# Patient Record
Sex: MALE | Race: WHITE | Employment: FULL TIME | URBAN - METROPOLITAN AREA
[De-identification: names, ages, dates, MRNs, and addresses within clinical notes are randomized per-mention and may not be internally consistent; named-entity substitution may affect disease eponyms.]

---

## 2024-01-24 ENCOUNTER — OFFICE VISIT (OUTPATIENT)
Dept: INTERNAL MEDICINE CLINIC | Facility: CLINIC | Age: 54
End: 2024-01-24
Payer: COMMERCIAL

## 2024-01-24 ENCOUNTER — TELEPHONE (OUTPATIENT)
Dept: INTERNAL MEDICINE CLINIC | Facility: CLINIC | Age: 54
End: 2024-01-24

## 2024-01-24 ENCOUNTER — LAB ENCOUNTER (OUTPATIENT)
Dept: LAB | Facility: REFERENCE LAB | Age: 54
End: 2024-01-24
Attending: INTERNAL MEDICINE
Payer: COMMERCIAL

## 2024-01-24 VITALS
HEART RATE: 96 BPM | BODY MASS INDEX: 23.6 KG/M2 | WEIGHT: 163 LBS | OXYGEN SATURATION: 97 % | SYSTOLIC BLOOD PRESSURE: 108 MMHG | TEMPERATURE: 98 F | DIASTOLIC BLOOD PRESSURE: 66 MMHG | HEIGHT: 69.5 IN

## 2024-01-24 DIAGNOSIS — Z76.89 ENCOUNTER TO ESTABLISH CARE WITH NEW DOCTOR: Primary | ICD-10-CM

## 2024-01-24 DIAGNOSIS — K40.90 UNILATERAL INGUINAL HERNIA WITHOUT OBSTRUCTION OR GANGRENE, RECURRENCE NOT SPECIFIED: ICD-10-CM

## 2024-01-24 DIAGNOSIS — Z12.5 ENCOUNTER FOR SCREENING FOR MALIGNANT NEOPLASM OF PROSTATE: ICD-10-CM

## 2024-01-24 DIAGNOSIS — Z00.00 ANNUAL PHYSICAL EXAM: ICD-10-CM

## 2024-01-24 DIAGNOSIS — D22.9 CHANGE IN MOLE: ICD-10-CM

## 2024-01-24 LAB
ALBUMIN SERPL-MCNC: 4.8 G/DL (ref 3.2–4.8)
ALBUMIN/GLOB SERPL: 1.5 {RATIO} (ref 1–2)
ALP LIVER SERPL-CCNC: 75 U/L
ALT SERPL-CCNC: 29 U/L
ANION GAP SERPL CALC-SCNC: 8 MMOL/L (ref 0–18)
AST SERPL-CCNC: 20 U/L (ref ?–34)
BASOPHILS # BLD AUTO: 0.04 X10(3) UL (ref 0–0.2)
BASOPHILS NFR BLD AUTO: 0.5 %
BILIRUB SERPL-MCNC: 0.8 MG/DL (ref 0.3–1.2)
BUN BLD-MCNC: 9 MG/DL (ref 9–23)
BUN/CREAT SERPL: 8.5 (ref 10–20)
CALCIUM BLD-MCNC: 10.5 MG/DL (ref 8.7–10.4)
CHLORIDE SERPL-SCNC: 102 MMOL/L (ref 98–112)
CHOLEST SERPL-MCNC: 192 MG/DL (ref ?–200)
CO2 SERPL-SCNC: 30 MMOL/L (ref 21–32)
COMPLEXED PSA SERPL-MCNC: 1.54 NG/ML (ref ?–4)
CREAT BLD-MCNC: 1.06 MG/DL
DEPRECATED RDW RBC AUTO: 39 FL (ref 35.1–46.3)
EGFRCR SERPLBLD CKD-EPI 2021: 84 ML/MIN/1.73M2 (ref 60–?)
EOSINOPHIL # BLD AUTO: 0.42 X10(3) UL (ref 0–0.7)
EOSINOPHIL NFR BLD AUTO: 5.5 %
ERYTHROCYTE [DISTWIDTH] IN BLOOD BY AUTOMATED COUNT: 12.9 % (ref 11–15)
EST. AVERAGE GLUCOSE BLD GHB EST-MCNC: 114 MG/DL (ref 68–126)
FASTING PATIENT LIPID ANSWER: NO
FASTING STATUS PATIENT QL REPORTED: NO
GLOBULIN PLAS-MCNC: 3.2 G/DL (ref 2.8–4.4)
GLUCOSE BLD-MCNC: 110 MG/DL (ref 70–99)
HBA1C MFR BLD: 5.6 % (ref ?–5.7)
HCT VFR BLD AUTO: 48 %
HDLC SERPL-MCNC: 59 MG/DL (ref 40–59)
HGB BLD-MCNC: 16.1 G/DL
IMM GRANULOCYTES # BLD AUTO: 0.01 X10(3) UL (ref 0–1)
IMM GRANULOCYTES NFR BLD: 0.1 %
LDLC SERPL CALC-MCNC: 120 MG/DL (ref ?–100)
LYMPHOCYTES # BLD AUTO: 2.33 X10(3) UL (ref 1–4)
LYMPHOCYTES NFR BLD AUTO: 30.4 %
MCH RBC QN AUTO: 27.9 PG (ref 26–34)
MCHC RBC AUTO-ENTMCNC: 33.5 G/DL (ref 31–37)
MCV RBC AUTO: 83 FL
MONOCYTES # BLD AUTO: 0.39 X10(3) UL (ref 0.1–1)
MONOCYTES NFR BLD AUTO: 5.1 %
NEUTROPHILS # BLD AUTO: 4.47 X10 (3) UL (ref 1.5–7.7)
NEUTROPHILS # BLD AUTO: 4.47 X10(3) UL (ref 1.5–7.7)
NEUTROPHILS NFR BLD AUTO: 58.4 %
NONHDLC SERPL-MCNC: 133 MG/DL (ref ?–130)
OSMOLALITY SERPL CALC.SUM OF ELEC: 289 MOSM/KG (ref 275–295)
PLATELET # BLD AUTO: 363 10(3)UL (ref 150–450)
POTASSIUM SERPL-SCNC: 4.6 MMOL/L (ref 3.5–5.1)
PROT SERPL-MCNC: 8 G/DL (ref 5.7–8.2)
RBC # BLD AUTO: 5.78 X10(6)UL
SODIUM SERPL-SCNC: 140 MMOL/L (ref 136–145)
TRIGL SERPL-MCNC: 69 MG/DL (ref 30–149)
VLDLC SERPL CALC-MCNC: 12 MG/DL (ref 0–30)
WBC # BLD AUTO: 7.7 X10(3) UL (ref 4–11)

## 2024-01-24 PROCEDURE — 36415 COLL VENOUS BLD VENIPUNCTURE: CPT | Performed by: INTERNAL MEDICINE

## 2024-01-24 PROCEDURE — 80053 COMPREHEN METABOLIC PANEL: CPT | Performed by: INTERNAL MEDICINE

## 2024-01-24 PROCEDURE — 3074F SYST BP LT 130 MM HG: CPT | Performed by: INTERNAL MEDICINE

## 2024-01-24 PROCEDURE — 3008F BODY MASS INDEX DOCD: CPT | Performed by: INTERNAL MEDICINE

## 2024-01-24 PROCEDURE — 85025 COMPLETE CBC W/AUTO DIFF WBC: CPT | Performed by: INTERNAL MEDICINE

## 2024-01-24 PROCEDURE — 80061 LIPID PANEL: CPT | Performed by: INTERNAL MEDICINE

## 2024-01-24 PROCEDURE — 99386 PREV VISIT NEW AGE 40-64: CPT | Performed by: INTERNAL MEDICINE

## 2024-01-24 PROCEDURE — 99203 OFFICE O/P NEW LOW 30 MIN: CPT | Performed by: INTERNAL MEDICINE

## 2024-01-24 PROCEDURE — 3078F DIAST BP <80 MM HG: CPT | Performed by: INTERNAL MEDICINE

## 2024-01-24 PROCEDURE — 83036 HEMOGLOBIN GLYCOSYLATED A1C: CPT | Performed by: INTERNAL MEDICINE

## 2024-01-31 NOTE — PROGRESS NOTES
[facility]  New Patient History and Physical      HPI:     Chief Complaint   Patient presents with    [facility]    Physical       Diane Hamilton is a 53 year old male presenting for:  Establishment [facility].  Has  has no past medical history on file.    Has a mole on anterior chest area that has grown over the past year.      Hx of unilateral inguinal hernia right side.  Feels some discomfort when lifting heavy items       Labs:   Complete Metabolic Panel:  Lab Results   Component Value Date/Time     01/24/2024 12:45 PM    K 4.6 01/24/2024 12:45 PM     01/24/2024 12:45 PM    CO2 30.0 01/24/2024 12:45 PM    CREATSERUM 1.06 01/24/2024 12:45 PM    CA 10.5 (H) 01/24/2024 12:45 PM     (H) 01/24/2024 12:45 PM    TP 8.0 01/24/2024 12:45 PM    ALB 4.8 01/24/2024 12:45 PM    ALKPHO 75 01/24/2024 12:45 PM    AST 20 01/24/2024 12:45 PM    ALT 29 01/24/2024 12:45 PM    BILT 0.8 01/24/2024 12:45 PM        CBC:  @LABRCNTIP(RBC:3,HGB:3,HCT:3,MCV:3,MCH:3,MCHC:3,RDW:3,NEPRELIM:3,WBC:3,PLT:3)@       Hemoglobin A1C, Microalbumin  Lab Results   Component Value Date/Time    A1C 5.6 01/24/2024 12:45 PM        Lipid panel  Lab Results   Component Value Date/Time    CHOLEST 192 01/24/2024 12:45 PM    HDL 59 01/24/2024 12:45 PM    TRIG 69 01/24/2024 12:45 PM     (H) 01/24/2024 12:45 PM    NONHDLC 133 (H) 01/24/2024 12:45 PM     The 10-year ASCVD risk score (Samm JIMENEZ, et al., 2019) is: 2.8%    Values used to calculate the score:      Age: 53 years      Sex: Male      Is Non- : No      Diabetic: No      Tobacco smoker: No      Systolic Blood Pressure: 108 mmHg      Is BP treated: No      HDL Cholesterol: 59 mg/dL      Total Cholesterol: 192 mg/dL       Medications:  No current outpatient medications on file.      PMH:  History reviewed. No pertinent past medical history.      PSH:  Past Surgical History:   Procedure Laterality Date    OTHER SURGICAL HISTORY       cyst removed form forehead and wrist       Allergies:  No Known Allergies   Social History:  Social History     Socioeconomic History    Marital status:    Tobacco Use    Smoking status: Never     Passive exposure: Never    Smokeless tobacco: Never   Substance and Sexual Activity    Alcohol use: Never    Drug use: Never        Family History:  Family History   Problem Relation Age of Onset    Other (Other) Father         liver problem    Other (Other) Mother         arthritis    Psychiatric Son         ptsd    Diabetes Brother         prediabets          REVIEW OF SYSTEMS:   Review of Systems   Constitutional:  Negative for chills, fatigue, fever and unexpected weight change.   HENT:  Negative for congestion, ear pain, hearing loss, rhinorrhea, sinus pain and sore throat.    Eyes:  Negative for pain, redness and visual disturbance.   Respiratory:  Negative for apnea, cough, chest tightness, shortness of breath and wheezing.    Cardiovascular:  Negative for chest pain, palpitations and leg swelling.   Gastrointestinal:  Positive for abdominal pain. Negative for abdominal distention, blood in stool, constipation and nausea.        See hernia symptoms HPI   Endocrine: Negative for cold intolerance, heat intolerance and polyuria.   Genitourinary:  Negative for dysuria, hematuria and urgency.   Musculoskeletal:  Negative for arthralgias, back pain, gait problem, joint swelling, myalgias and neck pain.   Skin:  Negative for rash and wound.   Allergic/Immunologic: Negative for food allergies and immunocompromised state.   Neurological:  Negative for dizziness, seizures, facial asymmetry, speech difficulty, weakness, light-headedness, numbness and headaches.   Hematological:  Negative for adenopathy. Does not bruise/bleed easily.   Psychiatric/Behavioral:  Negative for behavioral problems, sleep disturbance and suicidal ideas. The patient is not nervous/anxious.             PHYSICAL EXAM:   /66 (BP Location:  Right arm, Patient Position: Sitting, Cuff Size: adult)   Pulse 96   Temp 98.1 °F (36.7 °C) (Temporal)   Ht 5' 9.5\" (1.765 m)   Wt 163 lb (73.9 kg)   SpO2 97%   BMI 23.73 kg/m²  Estimated body mass index is 23.73 kg/m² as calculated from the following:    Height as of this encounter: 5' 9.5\" (1.765 m).    Weight as of this encounter: 163 lb (73.9 kg).     Wt Readings from Last 3 Encounters:   01/24/24 163 lb (73.9 kg)       Physical Exam  Vitals reviewed.   Constitutional:       General: He is not in acute distress.     Appearance: He is well-developed.   HENT:      Head: Normocephalic and atraumatic.   Eyes:      Conjunctiva/sclera: Conjunctivae normal.      Pupils: Pupils are equal, round, and reactive to light.   Neck:      Thyroid: No thyromegaly.   Cardiovascular:      Rate and Rhythm: Normal rate and regular rhythm.      Heart sounds: Normal heart sounds, S1 normal and S2 normal. No murmur heard.     No friction rub. No gallop.   Pulmonary:      Effort: Pulmonary effort is normal. No respiratory distress.      Breath sounds: Normal breath sounds. No wheezing or rales.   Chest:      Chest wall: No tenderness.   Abdominal:      General: Bowel sounds are normal. There is no distension.      Palpations: Abdomen is soft. There is no mass.      Tenderness: There is no abdominal tenderness. There is no guarding or rebound.   Musculoskeletal:         General: No tenderness. Normal range of motion.      Cervical back: Normal range of motion.   Lymphadenopathy:      Cervical: No cervical adenopathy.   Skin:     General: Skin is warm.      Findings: No erythema or rash.   Neurological:      Mental Status: He is alert and oriented to person, place, and time.      Cranial Nerves: No cranial nerve deficit.      Deep Tendon Reflexes: Reflexes are normal and symmetric.   Psychiatric:         Behavior: Behavior normal.         Thought Content: Thought content normal.         Judgment: Judgment normal.              ASSESSMENT AND PLAN:   Patient is a 53 year old male who presents primarily presents for:    (Z76.89) Encounter to establish care with new doctor  (primary encounter diagnosis)  Plan: CBC With Differential With Platelet, Comp         Metabolic Panel (14) [E], Lipid Panel [E]            (Z00.00) Annual physical exam  Plan: CBC With Differential With Platelet, Comp         Metabolic Panel (14) [E], Lipid Panel [E],         Hemoglobin A1C (Glycohemoglobin) [E]            (Z12.5) Encounter for screening for malignant neoplasm of prostate  Plan: PSA (Screening) [E]            (D22.9) Change in mole  Plan: Derm Referral - In Network            (K40.90) Unilateral inguinal hernia without obstruction or gangrene, recurrence not specified  Plan: z [facility] US GROIN LEFT SH(CPT=76882), z         [facility] US GROIN LEFT SH(CPT=76882)                  Health Maintenance:  Health Maintenance   Topic Date Due    Annual Physical  Never done    Zoster Vaccines (1 of 2) Never done    COVID-19 Vaccine (3 - 2023-24 season) 09/01/2023    Influenza Vaccine (1) 10/01/2023    Annual Depression Screening  Never done    Colorectal Cancer Screening  05/18/2024    PSA  01/24/2026    DTaP,Tdap,and Td Vaccines (3 - Td or Tdap) 05/20/2027    Pneumococcal Vaccine: Birth to 64yrs  Aged Out             Meds & Refills for this Visit:  Requested Prescriptions      No prescriptions requested or ordered in this encounter       Orders Placed This Encounter   Procedures    CBC With Differential With Platelet    Comp Metabolic Panel (14) [E]    Lipid Panel [E]    Hemoglobin A1C (Glycohemoglobin) [E]    PSA (Screening) [E]       Imaging & Consults:  DERM - INTERNAL  US GROIN LEFT LIMITED SH(JAS=97805)        Anselmo Naylor MD     Return in about 6 months (around 7/24/2024).  Important issues to follow up on next visit    Ensure he has seen dermatology and general surgery.        Patient indicates understanding of the above recommendations and  agrees to the above plan.  Reasurrance and education provided. All questions answered.  Notified to call with any questions, complications, allergies, or worsening or changing symptoms as well as any side effects or complications from the treatments .  Red flags/ ER precautions discussed.    This note was produced using voice recognition software.  As a result, errors may occur.  When identified, these errors have been corrected.  While every attempt is made to correct errors during dictation, errors may still exist.    Total time spent was 46 minutes which includes: Preparation to see patient including chart review, reviewing appropriate medical history, counseling and education (diet and exercise), discussing treatment options, ordering appropriate diagnostic tests and documentation.    Anselmo Naylor MD  Internal Medicine/Primary Care  [facility]

## 2024-02-01 ENCOUNTER — TELEPHONE (OUTPATIENT)
Dept: INTERNAL MEDICINE CLINIC | Facility: CLINIC | Age: 54
End: 2024-02-01

## 2024-02-01 NOTE — TELEPHONE ENCOUNTER
lmtcb      ----- Message from Anselmo Naylor MD sent at 1/31/2024  1:24 AM CST -----  Please inform that labs reviewed.      A1c normal.   Lipid panel with mildly elevated LDL.  Increase consumption of vegetables, fruits, herbs, nuts, beans and whole grains.  Decrease intake of saturated fats, processed foods, meats and sweets.  Moderate amounts of dairy, poultry and seafood.    PSA normal.      His groin US revealed fat containing hernia. Given his symptoms should see surgery.  Referral entered.      PQ

## 2024-02-05 ENCOUNTER — OFFICE VISIT (OUTPATIENT)
Dept: DERMATOLOGY CLINIC | Facility: CLINIC | Age: 54
End: 2024-02-05

## 2024-02-05 DIAGNOSIS — D49.2 NEOPLASM OF UNSPECIFIED BEHAVIOR OF BONE, SOFT TISSUE, AND SKIN: ICD-10-CM

## 2024-02-05 DIAGNOSIS — L81.4 LENTIGINES: Primary | ICD-10-CM

## 2024-02-05 NOTE — PROGRESS NOTES
New Patient    Referred by: Anselmo Naylor MD  [address]  [address]  [place] [address]    CHIEF COMPLAINT: Lesion of concern     HISTORY OF PRESENT ILLNESS: Diane Hamilton is a 53 year old male here for evaluation of lesion of concern.    1. Growth   Location: chest  Duration: 10 years  Signs and symptoms: grown over the years. No discomfort, no bleeding  Current treatment: none  Past treatments: none      Personal Dermatologic History  History of skin cancer: No  History of  atypical moles: No    FAMILY HISTORY:  History of melanoma: No    Past Medical History  History reviewed. No pertinent past medical history.    REVIEW OF SYSTEMS:  Constitutional: Denies fever, chills, unintentional weight loss.   Skin as per HPI    Medications  No current outpatient medications on file.       PHYSICAL EXAM:  General: awake, alert, no acute distress  Neuropsych: appropriate mood and affect  Eyes: Sclerae anicteric, without conjunctival injection, eyelids unremarkable  Skin: Skin exam was performed today including the following: abdomen, chest, and shoulders. Pertinent findings include:   - shoulders with stellate light brown macules  - chest with a brown papule    ASSESSMENT & PLAN:  Pathophysiology of diagnoses discussed with patient.  Therapeutic options reviewed. Risks, benefits, and alternatives discussed with patient. Instructions reviewed at length.    #Lentigines  - Discussed benign appearance and provided reassurance. No treatment but observation at this time. Follow-up for concerning physical changes or new symptoms.  - Recommend sun protection with spf 30 or higher, sun protective clothing such as wide brimmed hats and long sleeves. Recommend avoiding midday sun (10 am- 3 pm).     #Neoplasm(s) of uncertain behavior of skin, chest  - enlarging in size   - Shave biopsy performed today   - Will notify patient with results and arrange for appropriate definitive treatment, if indicated.      Shave of lesion  to establish and confirm diagnosis:  Photo taken: Yes    Risks, benefits, alternatives and personnel required for shave biopsy reviewed with patient. Risks discussed include, but not limited to: pain, bleeding, infection, scar, reaction to anesthetic, and recurrence/need for further treatment.  Patient and physician agree as to site(s) to be biopsied. Patient verbalizes understanding and wishes to proceed.     Site(s) prepped with alcohol and anesthetized with 1% lidocaine with epinephrine.   Shave of lesion(s) performed to the level of the dermis. Specimen(s) from A. chest  sent for pathology to r/o  ISK vs nevus  50% ALCL and bandaging applied.   Written and verbal wound care instructions provided to patient, understanding verbalized.      Return to clinic: as needed or sooner if something concerning arises     Kirt Barcenas MD

## 2024-03-21 ENCOUNTER — TELEPHONE (OUTPATIENT)
Dept: DERMATOLOGY CLINIC | Facility: CLINIC | Age: 54
End: 2024-03-21

## 2024-03-21 NOTE — TELEPHONE ENCOUNTER
Agreed. Spot removed was per patient request and there was no concern for abnormal pathology. Thank you.     Kirt Barcenas MD

## 2024-03-21 NOTE — TELEPHONE ENCOUNTER
Upon review of path book, biopsy done 2/5/24, specimen sent to [facility] then, tracking # [id number] - specimen  verified on 2/5/24 with Von[facility] - NOT resulted from [facility].    Called [facility] today at , s/w [place] - they cannot locate the patient at all in their system.     Please let us know if you'd like us to notify the pt.

## 2024-03-21 NOTE — TELEPHONE ENCOUNTER
LMTCB for pt, discussed with DM, per DM:    Please let pt know that it was a harmless spot based on our exam so there is no need to be concerned.

## 2024-03-22 NOTE — TELEPHONE ENCOUNTER
Dr. Barcenas - apologies, but am I discussing what happened with the biopsy or discussing with the patient that it was a harmless lesion and was removed based on pt request?  Thank you.

## 2024-03-23 NOTE — TELEPHONE ENCOUNTER
DM, disregard.   S/w and informed biopsy done 2/5/24 - specimen lost by [facility] and there was no concern for abnormal pathology. Pt was very understanding and has no further questions.

## 2024-04-16 ENCOUNTER — OFFICE VISIT (OUTPATIENT)
Dept: INTERNAL MEDICINE CLINIC | Facility: CLINIC | Age: 54
End: 2024-04-16
Payer: COMMERCIAL

## 2024-04-16 VITALS
OXYGEN SATURATION: 98 % | BODY MASS INDEX: 25 KG/M2 | SYSTOLIC BLOOD PRESSURE: 116 MMHG | WEIGHT: 171 LBS | HEART RATE: 71 BPM | DIASTOLIC BLOOD PRESSURE: 70 MMHG

## 2024-04-16 DIAGNOSIS — T78.40XD ALLERGY, SUBSEQUENT ENCOUNTER: Primary | ICD-10-CM

## 2024-04-16 DIAGNOSIS — R06.00 DYSPNEA, UNSPECIFIED TYPE: ICD-10-CM

## 2024-04-16 DIAGNOSIS — R07.89 CHEST PRESSURE: ICD-10-CM

## 2024-04-16 DIAGNOSIS — R42 LIGHT HEADED: ICD-10-CM

## 2024-04-16 PROCEDURE — 99214 OFFICE O/P EST MOD 30 MIN: CPT | Performed by: INTERNAL MEDICINE

## 2024-04-16 RX ORDER — FEXOFENADINE HCL 180 MG/1
180 TABLET ORAL DAILY
Qty: 90 TABLET | Refills: 1 | Status: SHIPPED | OUTPATIENT
Start: 2024-04-16

## 2024-04-16 RX ORDER — AZELASTINE 1 MG/ML
1 SPRAY, METERED NASAL 2 TIMES DAILY
Qty: 1 EACH | Refills: 1 | Status: SHIPPED | OUTPATIENT
Start: 2024-04-16

## 2024-04-24 NOTE — PROGRESS NOTES
[facility]  Return Patient      HPI:     Chief Complaint   Patient presents with    Eye Problem     Tears frequently     Other     Feels head pressure       Diane Hamilton is a 53 year old male presenting for:  Progress note.  Has  has no past medical history on file.      Allergies reported.     Reports feeling light headed.      Dyspnea on exertion with occassional chest pain.    Labs:   Complete Metabolic Panel:  Lab Results   Component Value Date/Time     01/24/2024 12:45 PM    K 4.6 01/24/2024 12:45 PM     01/24/2024 12:45 PM    CO2 30.0 01/24/2024 12:45 PM    CREATSERUM 1.06 01/24/2024 12:45 PM    CA 10.5 (H) 01/24/2024 12:45 PM     (H) 01/24/2024 12:45 PM    TP 8.0 01/24/2024 12:45 PM    ALB 4.8 01/24/2024 12:45 PM    ALKPHO 75 01/24/2024 12:45 PM    AST 20 01/24/2024 12:45 PM    ALT 29 01/24/2024 12:45 PM    BILT 0.8 01/24/2024 12:45 PM        CBC:  @LABRCNTIP(RBC:3,HGB:3,HCT:3,MCV:3,MCH:3,MCHC:3,RDW:3,NEPRELIM:3,WBC:3,PLT:3)@       Hemoglobin A1C, Microalbumin  Lab Results   Component Value Date/Time    A1C 5.6 01/24/2024 12:45 PM        Lipid panel  Lab Results   Component Value Date/Time    CHOLEST 192 01/24/2024 12:45 PM    HDL 59 01/24/2024 12:45 PM    TRIG 69 01/24/2024 12:45 PM     (H) 01/24/2024 12:45 PM    NONHDLC 133 (H) 01/24/2024 12:45 PM     The 10-year ASCVD risk score (Samm JIMENEZ, et al., 2019) is: 3.2%    Values used to calculate the score:      Age: 53 years      Sex: Male      Is Non- : No      Diabetic: No      Tobacco smoker: No      Systolic Blood Pressure: 116 mmHg      Is BP treated: No      HDL Cholesterol: 59 mg/dL      Total Cholesterol: 192 mg/dL       Medications:  Current Outpatient Medications   Medication Sig Dispense Refill    fexofenadine (ALLEGRA ALLERGY) 180 MG Oral Tab Take 1 tablet (180 mg total) by mouth daily. 90 tablet 1    azelastine 0.1 % Nasal Solution 1 spray by Nasal route 2 (two) times daily.  1 each 1      PMH:  History reviewed. No pertinent past medical history.      PSH:  Past Surgical History:   Procedure Laterality Date    Other surgical history      cyst removed form forehead and wrist       Allergies:  No Known Allergies   Social History:  Social History     Socioeconomic History    Marital status:    Tobacco Use    Smoking status: Never     Passive exposure: Never    Smokeless tobacco: Never   Substance and Sexual Activity    Alcohol use: Never    Drug use: Never   Other Topics Concern    Reaction to local anesthetic No    Pt has a pacemaker No    Pt has a defibrillator No     Social Determinants of Health     Financial Resource Strain: Low Risk  (4/14/2021)    [facility]    Overall Financial Resource Strain (CARDIA)     Difficulty of Paying Living Expenses: Not hard at all   Food Insecurity: No Food Insecurity (4/14/2021)    [facility]    Hunger Vital Sign     Worried About Running Out of Food in the Last Year: Never true     Ran Out of Food in the Last Year: Never true   Transportation Needs: No Transportation Needs (4/14/2021)    [facility]    PRAPARE - Transportation     Lack of Transportation (Medical): No     Lack of Transportation (Non-Medical): No        Family History:  Family History   Problem Relation Age of Onset    Other (Other) Father         liver problem    Other (Other) Mother         arthritis    Psychiatric Son         ptsd    Diabetes Brother         prediabets          REVIEW OF SYSTEMS:   Review of Systems   Constitutional:  Negative for chills, fatigue, fever and unexpected weight change.   HENT:  Negative for congestion, ear pain, hearing loss, rhinorrhea, sinus pain and sore throat.    Eyes:  Negative for pain, redness and visual disturbance.   Respiratory:   Negative for apnea, cough, chest tightness, shortness of breath and wheezing.    Cardiovascular:  Negative for chest pain, palpitations and leg swelling.   Gastrointestinal:  Negative for abdominal distention, abdominal pain, blood in stool, constipation and nausea.        See hernia symptoms HPI   Endocrine: Negative for cold intolerance, heat intolerance and polyuria.   Genitourinary:  Negative for dysuria, hematuria and urgency.   Musculoskeletal:  Negative for arthralgias, back pain, gait problem, joint swelling, myalgias and neck pain.   Skin:  Negative for rash and wound.   Allergic/Immunologic: Negative for food allergies and immunocompromised state.   Neurological:  Negative for dizziness, seizures, facial asymmetry, speech difficulty, weakness, light-headedness, numbness and headaches.   Hematological:  Negative for adenopathy. Does not bruise/bleed easily.   Psychiatric/Behavioral:  Negative for behavioral problems, sleep disturbance and suicidal ideas. The patient is not nervous/anxious.             PHYSICAL EXAM:   /70   Pulse 71   Wt 171 lb (77.6 kg)   SpO2 98%   BMI 24.89 kg/m²  Estimated body mass index is 24.89 kg/m² as calculated from the following:    Height as of 1/24/24: 5' 9.5\" (1.765 m).    Weight as of this encounter: 171 lb (77.6 kg).     Wt Readings from Last 3 Encounters:   04/16/24 171 lb (77.6 kg)   01/24/24 163 lb (73.9 kg)       Physical Exam  Vitals reviewed.   Constitutional:       General: He is not in acute distress.     Appearance: He is well-developed.   HENT:      Head: Normocephalic and atraumatic.   Eyes:      Conjunctiva/sclera: Conjunctivae normal.      Pupils: Pupils are equal, round, and reactive to light.   Neck:      Thyroid: No thyromegaly.   Cardiovascular:      Rate and Rhythm: Normal rate and regular rhythm.      Heart sounds: Normal heart sounds, S1 normal and S2 normal. No murmur heard.     No friction rub. No gallop.   Pulmonary:      Effort: Pulmonary  effort is normal. No respiratory distress.      Breath sounds: Normal breath sounds. No wheezing or rales.   Chest:      Chest wall: No tenderness.   Abdominal:      General: Bowel sounds are normal. There is no distension.      Palpations: Abdomen is soft. There is no mass.      Tenderness: There is no abdominal tenderness. There is no guarding or rebound.   Musculoskeletal:         General: No tenderness. Normal range of motion.      Cervical back: Normal range of motion.   Lymphadenopathy:      Cervical: No cervical adenopathy.   Skin:     General: Skin is warm.      Findings: No erythema or rash.   Neurological:      Mental Status: He is alert and oriented to person, place, and time.      Cranial Nerves: No cranial nerve deficit.      Deep Tendon Reflexes: Reflexes are normal and symmetric.   Psychiatric:         Behavior: Behavior normal.         Thought Content: Thought content normal.         Judgment: Judgment normal.             ASSESSMENT AND PLAN:   Patient is a 53 year old male who presents primarily presents for:    (T78.40XD) Allergy, subsequent encounter  (primary encounter diagnosis)  Comment: teary eyes. Post nasal drip.  Discussed supportive treatment.  Allegra and azelastine sent.      (R42) Light headed  Plan: Trial treatment as above.      (R06.00) Dyspnea, unspecified type  Plan: CARD ECHO STRESS ECHO/REST AND         STRESS(CPT=93350/80061 [facility] 00679)            (R07.89) Chest pressure  Plan: CARD ECHO STRESS ECHO/REST AND         STRESS(CPT=93350/79393 [facility] 53208)                      Health Maintenance:  Health Maintenance   Topic Date Due    Annual Physical  Never done    Zoster Vaccines (1 of 2) Never done    COVID-19 Vaccine (3 - 2023-24 season) 09/01/2023    Influenza Vaccine (1) 10/01/2023    Annual Depression Screening  Never done    Colorectal Cancer Screening  05/18/2024    PSA  01/24/2026    DTaP,Tdap,and Td Vaccines (3 - Td or Tdap) 05/20/2027    Pneumococcal Vaccine: Birth to 64yrs   Aged Out             Meds & Refills for this Visit:  Requested Prescriptions     Signed Prescriptions Disp Refills    fexofenadine (ALLEGRA ALLERGY) 180 MG Oral Tab 90 tablet 1     Sig: Take 1 tablet (180 mg total) by mouth daily.    azelastine 0.1 % Nasal Solution 1 each 1     Si spray by Nasal route 2 (two) times daily.       No orders of the defined types were placed in this encounter.      Imaging & Consults:  CARD ECHO STRESS ECHO/REST AND STRESS(CPT=93350/99027 [facility] [address]        Anselmo Naylor MD     Return in about 6 months (around 10/16/2024) for Symptom monitoring.  Important issues to follow up on next visit    Ensure he has seen dermatology and general surgery.        Patient indicates understanding of the above recommendations and agrees to the above plan.  Reasurrance and education provided. All questions answered.  Notified to call with any questions, complications, allergies, or worsening or changing symptoms as well as any side effects or complications from the treatments .  Red flags/ ER precautions discussed.    This note was produced using voice recognition software.  As a result, errors may occur.  When identified, these errors have been corrected.  While every attempt is made to correct errors during dictation, errors may still exist.    Total time spent was 46 minutes which includes: Preparation to see patient including chart review, reviewing appropriate medical history, counseling and education (diet and exercise), discussing treatment options, ordering appropriate diagnostic tests and documentation.    Anselmo Naylor MD  Internal Medicine/Primary Care  [facility]